# Patient Record
Sex: MALE | Race: WHITE | Employment: FULL TIME
[De-identification: names, ages, dates, MRNs, and addresses within clinical notes are randomized per-mention and may not be internally consistent; named-entity substitution may affect disease eponyms.]

---

## 2023-10-09 ENCOUNTER — HOSPITAL ENCOUNTER (OUTPATIENT)
Facility: HOSPITAL | Age: 43
Setting detail: RECURRING SERIES
Discharge: HOME | End: 2023-10-12
Payer: COMMERCIAL

## 2023-10-09 PROCEDURE — 97162 PT EVAL MOD COMPLEX 30 MIN: CPT | Performed by: PHYSICAL THERAPIST

## 2023-10-09 PROCEDURE — 97110 THERAPEUTIC EXERCISES: CPT | Performed by: PHYSICAL THERAPIST

## 2023-10-09 NOTE — THERAPY EVALUATION
[address] [facility] [employer] PHYSICAL THERAPY  [address], [address] Ph: [phone] Fx: [phone]  Plan of Care / Statement of Necessity for Physical Therapy Services     Patient Name: Giuseppe Kapoor : 1980   Medical   Diagnosis: Other low back pain [M54.59] Treatment Diagnosis:   M54.59  OTHER LOWER BACK PAIN    Onset Date: 2023     Referral Source: Manuela Riddle DO Start of Care [facility]): 10/9/2023   Prior Hospitalization: See medical history Provider #: [id number]   Prior Level of Function: Parent of 3 children, hiking, hunting, dirt biking, independent. Comorbidities: Back Pain, BMI 32.3. GI Disease. Assessment / key information:  Patient with signs and symptoms consistent with acute exacerbation of chronic back pain. Patient reports he has had intermittent episodes of back pain but his current event occurred in September after a very busy time. His pain is centralized but notes an ache in the left lateral thigh. He presents with good AROM and strength. He has pain with some movements. Gait is without deviation. Functionally, he notes he is doing everything with the pain as indicated. Patient will benefit from a program of skilled physical therapy to include therapeutic exercises to address strength deficits, therapeutic activities to improve functional mobility, neuromuscular reeducation to address balance, coordination and proprioception, manual therapy to address ROM and tissue extensibility and modalities as indicated. All questions were answered. Evaluation Complexity:  History:  MEDIUM  Complexity : 1-2 comorbidities / personal factors will impact the outcome/ POC ; Examination:  MEDIUM Complexity : 3 Standardized tests and measures addressin body structure, function, activity limitation and / or participation in recreation  ;Presentation:  MEDIUM Complexity : Evolving with changing characteristics  ; Clinical Decision Making:  MEDIUM

## 2023-10-09 NOTE — THERAPY EVALUATION
PT DAILY TREATMENT NOTE/LUMBAR EVAL     Patient Name: Timothy Marquis    Date: 10/9/2023    : 1980  Insurance: Payor: Cristin Cho / Plan: [employer] FEP / Product Type: *No Product type* /      Patient  verified yes     Visit #   Current / Total 1 8   Time   In / Out 9:00 9:40   Pain   In / Out 4 0   Subjective Functional Status/Changes: Patient with 10 year h/o back problems when he was doing some heavy lifting at home. Intermittent episodes since. Most recent episode since mid September. Treatment Area: Other low back pain [M54.59]  SUBJECTIVE  Pain Level (0-10 scale): 4/10 now; 2/10 at best; 8/10 at worst.  [x]constant []intermittent [x]improving []worsening []no change since onset    Any medication changes, allergies to medications, adverse drug reactions, diagnosis change, or new procedure performed?: [x] No    [] Yes (see summary sheet for update)  Subjective functional status/changes:     PLOF: Parent of 3 children, hiking, hunting, dirt biking, independent. Limitations to PLOF: None with pain as noted. Mechanism of Injury: Chronic back pain with recent exacerbation after driving 10 hours then doing a  lot of yard work. Current symptoms/Complaints: Patient with c/o constant pain. The pain is located in the lower lumbar area. States his doctor told him it was at L3. He notes lateral left thigh dull ache, this is intermittent. Previous Treatment/Compliance: None. PMHx/Surgical Hx: Acid Reflux. 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 Self Care: Independent. 30 min []Eval        - untimed        Therapeutic Procedures:   Tx Min Billable or 1:1 Min (if diff from [employer])

## 2023-10-12 ENCOUNTER — HOSPITAL ENCOUNTER (OUTPATIENT)
Facility: HOSPITAL | Age: 43
Setting detail: RECURRING SERIES
Discharge: HOME | End: 2023-10-15
Payer: COMMERCIAL

## 2023-10-12 PROCEDURE — 97112 NEUROMUSCULAR REEDUCATION: CPT | Performed by: PHYSICAL THERAPIST

## 2023-10-12 PROCEDURE — 97110 THERAPEUTIC EXERCISES: CPT | Performed by: PHYSICAL THERAPIST

## 2023-10-12 NOTE — PROGRESS NOTES
PHYSICAL / OCCUPATIONAL THERAPY - DAILY TREATMENT NOTE (updated )    Patient Name: Parish Rater    Date: 10/12/2023    : 1980  Insurance: Payor: Marixa Gutierrez / Plan: Glo Silvestre FEP / Product Type: *No Product type* /      Patient  verified Yes     Visit #   Current / Total 2 8   Time   In / Out 11:49 12:20   Pain   In / Out 2-3 0   Subjective Functional Status/Changes: Patient states he was hunting this morning, sitting in a tree stand and after about an hour he noted pain in the right side along the right iliac crest.     TREATMENT AREA =  Other low back pain [M54.59]    OBJECTIVE    Therapeutic Procedures:    22271 Therapeutic Exercise (timed):  increase ROM, strength, coordination, balance, and proprioception to improve patient's ability to progress to PLOF and address remaining functional goals. Tx Min Billable or 1:1 Min   (if diff from Tx Min) Details:   10  See flow sheet as applicable     39075 Neuromuscular Re-Education (timed):  improve balance, coordination, kinesthetic sense, posture, core stability and proprioception to improve patient's ability to develop conscious control of individual muscles and awareness of position of extremities in order to progress to PLOF and address remaining functional goals. Tx Min Billable or 1:1 Min   (if diff from Tx Min) Details:   21  See flow sheet as applicable       31  MC BC Totals Reminder: bill using total billable min of TIMED therapeutic procedures (example: do not include dry needle or estim unattended, both untimed codes, in totals to left)  8-22 min = 1 unit; 23-37 min = 2 units; 38-52 min = 3 units; 53-67 min = 4 units; 68-82 min = 5 units   Total Total     [x]  Patient Education billed concurrently with other procedures   [x] Review HEP    [] Progressed/Changed HEP, detail:    [] Other detail:       Objective Information/Functional Measures/Assessment  Patient in no apparent distress.   Good trunk mobility with his TE.  VC needed to engage

## 2023-10-16 ENCOUNTER — HOSPITAL ENCOUNTER (OUTPATIENT)
Facility: HOSPITAL | Age: 43
Setting detail: RECURRING SERIES
Discharge: HOME | End: 2023-10-19
Payer: COMMERCIAL

## 2023-10-16 PROCEDURE — 97112 NEUROMUSCULAR REEDUCATION: CPT | Performed by: PHYSICAL THERAPIST

## 2023-10-16 PROCEDURE — 97110 THERAPEUTIC EXERCISES: CPT | Performed by: PHYSICAL THERAPIST

## 2023-10-18 ENCOUNTER — HOSPITAL ENCOUNTER (OUTPATIENT)
Facility: HOSPITAL | Age: 43
Setting detail: RECURRING SERIES
Discharge: HOME | End: 2023-10-21
Payer: COMMERCIAL

## 2023-10-18 PROCEDURE — 97110 THERAPEUTIC EXERCISES: CPT

## 2023-10-18 PROCEDURE — 97112 NEUROMUSCULAR REEDUCATION: CPT

## 2023-10-18 NOTE — PROGRESS NOTES
PHYSICAL / OCCUPATIONAL THERAPY - DAILY TREATMENT NOTE (updated )    Patient Name: Sonia Dent    Date: 10/18/2023    : 1980  Insurance: Payor: Keo Moran / Plan: Cyndi Og FEP / Product Type: *No Product type* /      Patient  verified Yes     Visit #   Current / Total 4 8   Time   In / Out [id number]   Pain   In / Out 2 1   Subjective Functional Status/Changes: Patient states he was a little tighter yesterday than he was expecting to be. TREATMENT AREA =  Other low back pain [M54.59]    OBJECTIVE    Therapeutic Procedures:    49261 Therapeutic Exercise (timed):  increase ROM, strength, coordination, balance, and proprioception to improve patient's ability to progress to PLOF and address remaining functional goals. Tx Min Billable or 1:1 Min   (if diff from Tx Min) Details:   23  See flow sheet as applicable     75057 Neuromuscular Re-Education (timed):  improve balance, coordination, kinesthetic sense, posture, core stability and proprioception to improve patient's ability to develop conscious control of individual muscles and awareness of position of extremities in order to progress to PLOF and address remaining functional goals. Tx Min Billable or 1:1 Min   (if diff from Tx Min) Details:   16  See flow sheet as applicable       44  MC BC Totals Reminder: bill using total billable min of TIMED therapeutic procedures (example: do not include dry needle or estim unattended, both untimed codes, in totals to left)  8-22 min = 1 unit; 23-37 min = 2 units; 38-52 min = 3 units; 53-67 min = 4 units; 68-82 min = 5 units   Total Total     [x]  Patient Education billed concurrently with other procedures   [x] Review HEP    [] Progressed/Changed HEP, detail:    [] Other detail:       Objective Information/Functional Measures/Assessment    Further progressed core activation exercises to increase support with trunk control.      Patient will continue to benefit from skilled PT / OT services to modify and

## 2023-10-23 ENCOUNTER — TELEPHONE (OUTPATIENT)
Facility: HOSPITAL | Age: 43
End: 2023-10-23

## 2023-10-23 ENCOUNTER — APPOINTMENT (OUTPATIENT)
Facility: HOSPITAL | Age: 43
End: 2023-10-23
Payer: COMMERCIAL

## 2023-10-24 ENCOUNTER — TELEPHONE (OUTPATIENT)
Facility: HOSPITAL | Age: 43
End: 2023-10-24

## 2023-10-24 NOTE — TELEPHONE ENCOUNTER
CXL <24hrs. Patient has to work, unable to make 1/25 appt. Has been added to the wait list for another appt during the week.

## 2023-10-25 ENCOUNTER — APPOINTMENT (OUTPATIENT)
Facility: HOSPITAL | Age: 43
End: 2023-10-25
Payer: COMMERCIAL

## 2023-10-30 ENCOUNTER — APPOINTMENT (OUTPATIENT)
Facility: HOSPITAL | Age: 43
End: 2023-10-30
Payer: COMMERCIAL

## 2023-10-31 ENCOUNTER — TELEPHONE (OUTPATIENT)
Facility: HOSPITAL | Age: 43
End: 2023-10-31

## 2023-10-31 NOTE — TELEPHONE ENCOUNTER
Pt called to CX all future appts due to work schedule.  Asked to be put on hold and will call us back in a couple weeks when his work schedule is more condusive for PT.

## 2023-11-14 ENCOUNTER — TELEPHONE (OUTPATIENT)
Facility: HOSPITAL | Age: 43
End: 2023-11-14

## 2023-11-14 NOTE — TELEPHONE ENCOUNTER
Called patient to follow up on hold. No answer, left detailed message regarding our 30 day policy. Chart will be discharged after 11/18.